# Patient Record
Sex: FEMALE | Race: WHITE | HISPANIC OR LATINO
[De-identification: names, ages, dates, MRNs, and addresses within clinical notes are randomized per-mention and may not be internally consistent; named-entity substitution may affect disease eponyms.]

---

## 2017-11-11 ENCOUNTER — APPOINTMENT (OUTPATIENT)
Dept: OTOLARYNGOLOGY | Facility: CLINIC | Age: 32
End: 2017-11-11
Payer: COMMERCIAL

## 2017-11-11 VITALS
HEART RATE: 77 BPM | WEIGHT: 135 LBS | DIASTOLIC BLOOD PRESSURE: 69 MMHG | BODY MASS INDEX: 23.92 KG/M2 | SYSTOLIC BLOOD PRESSURE: 100 MMHG | HEIGHT: 63 IN

## 2017-11-11 DIAGNOSIS — Z86.39 PERSONAL HISTORY OF OTHER ENDOCRINE, NUTRITIONAL AND METABOLIC DISEASE: ICD-10-CM

## 2017-11-11 PROCEDURE — 99204 OFFICE O/P NEW MOD 45 MIN: CPT | Mod: 25

## 2017-11-11 PROCEDURE — 92567 TYMPANOMETRY: CPT

## 2017-11-11 PROCEDURE — 92557 COMPREHENSIVE HEARING TEST: CPT

## 2017-11-14 PROBLEM — Z86.39 HISTORY OF HYPOTHYROIDISM: Status: RESOLVED | Noted: 2017-11-11 | Resolved: 2017-11-14

## 2017-11-28 ENCOUNTER — APPOINTMENT (OUTPATIENT)
Dept: OTOLARYNGOLOGY | Facility: CLINIC | Age: 32
End: 2017-11-28

## 2017-12-13 ENCOUNTER — APPOINTMENT (OUTPATIENT)
Dept: OTOLARYNGOLOGY | Facility: CLINIC | Age: 32
End: 2017-12-13

## 2018-02-10 ENCOUNTER — APPOINTMENT (OUTPATIENT)
Dept: OTOLARYNGOLOGY | Facility: CLINIC | Age: 33
End: 2018-02-10
Payer: COMMERCIAL

## 2018-02-10 VITALS
HEART RATE: 76 BPM | WEIGHT: 135 LBS | HEIGHT: 63 IN | SYSTOLIC BLOOD PRESSURE: 111 MMHG | BODY MASS INDEX: 23.92 KG/M2 | DIASTOLIC BLOOD PRESSURE: 71 MMHG

## 2018-02-10 PROCEDURE — 99213 OFFICE O/P EST LOW 20 MIN: CPT

## 2018-02-10 RX ORDER — LEVOTHYROXINE SODIUM 0.05 MG/1
50 TABLET ORAL
Qty: 30 | Refills: 0 | Status: ACTIVE | COMMUNITY
Start: 2017-08-02

## 2018-02-10 RX ORDER — NORETHINDRONE 0.35 MG/1
0.35 TABLET ORAL
Qty: 28 | Refills: 0 | Status: DISCONTINUED | COMMUNITY
Start: 2017-08-02

## 2018-02-19 ENCOUNTER — FORM ENCOUNTER (OUTPATIENT)
Age: 33
End: 2018-02-19

## 2018-02-20 ENCOUNTER — OUTPATIENT (OUTPATIENT)
Dept: OUTPATIENT SERVICES | Facility: HOSPITAL | Age: 33
LOS: 1 days | End: 2018-02-20
Payer: COMMERCIAL

## 2018-02-20 ENCOUNTER — APPOINTMENT (OUTPATIENT)
Dept: OBGYN | Facility: CLINIC | Age: 33
End: 2018-02-20
Payer: COMMERCIAL

## 2018-02-20 ENCOUNTER — APPOINTMENT (OUTPATIENT)
Dept: MRI IMAGING | Facility: IMAGING CENTER | Age: 33
End: 2018-02-20
Payer: COMMERCIAL

## 2018-02-20 DIAGNOSIS — H81.02 MENIERE'S DISEASE, LEFT EAR: ICD-10-CM

## 2018-02-20 PROCEDURE — 99395 PREV VISIT EST AGE 18-39: CPT

## 2018-02-20 PROCEDURE — 70553 MRI BRAIN STEM W/O & W/DYE: CPT | Mod: 26

## 2018-02-20 PROCEDURE — A9585: CPT

## 2018-02-20 PROCEDURE — 70553 MRI BRAIN STEM W/O & W/DYE: CPT

## 2018-04-05 ENCOUNTER — APPOINTMENT (OUTPATIENT)
Dept: OTOLARYNGOLOGY | Facility: CLINIC | Age: 33
End: 2018-04-05

## 2018-04-05 ENCOUNTER — RX RENEWAL (OUTPATIENT)
Age: 33
End: 2018-04-05

## 2018-04-06 ENCOUNTER — APPOINTMENT (OUTPATIENT)
Dept: OBGYN | Facility: CLINIC | Age: 33
End: 2018-04-06
Payer: COMMERCIAL

## 2018-04-06 ENCOUNTER — APPOINTMENT (OUTPATIENT)
Dept: OTOLARYNGOLOGY | Facility: CLINIC | Age: 33
End: 2018-04-06
Payer: COMMERCIAL

## 2018-04-06 PROCEDURE — 92537 CALORIC VSTBLR TEST W/REC: CPT

## 2018-04-06 PROCEDURE — 92540 BASIC VESTIBULAR EVALUATION: CPT

## 2018-04-06 PROCEDURE — 81025 URINE PREGNANCY TEST: CPT

## 2018-04-06 PROCEDURE — 92547 SUPPLEMENTAL ELECTRICAL TEST: CPT

## 2018-04-06 PROCEDURE — 92567 TYMPANOMETRY: CPT

## 2018-04-06 PROCEDURE — 92700A: CUSTOM

## 2018-04-06 PROCEDURE — 76998 US GUIDE INTRAOP: CPT

## 2018-04-06 PROCEDURE — 58300 INSERT INTRAUTERINE DEVICE: CPT

## 2018-04-07 ENCOUNTER — APPOINTMENT (OUTPATIENT)
Dept: OTOLARYNGOLOGY | Facility: CLINIC | Age: 33
End: 2018-04-07
Payer: COMMERCIAL

## 2018-04-07 PROCEDURE — 99213 OFFICE O/P EST LOW 20 MIN: CPT

## 2018-04-20 ENCOUNTER — RX RENEWAL (OUTPATIENT)
Age: 33
End: 2018-04-20

## 2018-05-10 ENCOUNTER — APPOINTMENT (OUTPATIENT)
Dept: OBGYN | Facility: CLINIC | Age: 33
End: 2018-05-10

## 2018-05-14 ENCOUNTER — APPOINTMENT (OUTPATIENT)
Dept: OBGYN | Facility: CLINIC | Age: 33
End: 2018-05-14
Payer: COMMERCIAL

## 2018-05-14 PROCEDURE — 99213 OFFICE O/P EST LOW 20 MIN: CPT

## 2019-07-01 ENCOUNTER — APPOINTMENT (OUTPATIENT)
Dept: OBGYN | Facility: CLINIC | Age: 34
End: 2019-07-01

## 2019-07-09 ENCOUNTER — RESULT REVIEW (OUTPATIENT)
Age: 34
End: 2019-07-09

## 2019-07-10 ENCOUNTER — APPOINTMENT (OUTPATIENT)
Dept: OBGYN | Facility: CLINIC | Age: 34
End: 2019-07-10
Payer: COMMERCIAL

## 2019-07-10 PROCEDURE — 99395 PREV VISIT EST AGE 18-39: CPT

## 2019-08-20 ENCOUNTER — APPOINTMENT (OUTPATIENT)
Dept: OTOLARYNGOLOGY | Facility: CLINIC | Age: 34
End: 2019-08-20
Payer: COMMERCIAL

## 2019-08-20 VITALS
HEART RATE: 80 BPM | DIASTOLIC BLOOD PRESSURE: 75 MMHG | BODY MASS INDEX: 24.98 KG/M2 | WEIGHT: 141 LBS | SYSTOLIC BLOOD PRESSURE: 104 MMHG | HEIGHT: 63 IN

## 2019-08-20 PROCEDURE — 99213 OFFICE O/P EST LOW 20 MIN: CPT | Mod: 25

## 2019-08-20 PROCEDURE — 92557 COMPREHENSIVE HEARING TEST: CPT

## 2019-08-20 PROCEDURE — 92567 TYMPANOMETRY: CPT

## 2019-08-20 NOTE — REASON FOR VISIT
[Subsequent Evaluation] : a subsequent evaluation for [Other: _____] : [unfilled] [FreeTextEntry2] : follow up for annual check up, s/p VEMP, history of MD and MAD, salt diet with diuretic prescribed with Meclizine as needed.

## 2019-08-20 NOTE — HISTORY OF PRESENT ILLNESS
[de-identified] : 34 year old female follow up for annual check up, s/p VEMP, history of MD and MAD, salt diet with diuretic prescribed with Meclizine as needed.  Patient states doing well overall, reports no issues.  States Meclizine and Ativan taken as prescribed with good effect.  Denies "head spinning" episodes, states "feeling fine."  Reports mild/intermittent Left ear fullness, denies bilateral otalgia.  Reports constant Left tinnitus.  Denies otorrhea, hearing loss, dizziness, vertigo and ear infections in the past year.

## 2019-12-22 ENCOUNTER — FORM ENCOUNTER (OUTPATIENT)
Age: 34
End: 2019-12-22

## 2020-08-04 ENCOUNTER — APPOINTMENT (OUTPATIENT)
Dept: OTOLARYNGOLOGY | Facility: CLINIC | Age: 35
End: 2020-08-04
Payer: COMMERCIAL

## 2020-08-04 VITALS
TEMPERATURE: 98.5 F | DIASTOLIC BLOOD PRESSURE: 69 MMHG | HEART RATE: 83 BPM | SYSTOLIC BLOOD PRESSURE: 105 MMHG | WEIGHT: 145 LBS | HEIGHT: 63 IN | BODY MASS INDEX: 25.69 KG/M2

## 2020-08-04 PROCEDURE — 99214 OFFICE O/P EST MOD 30 MIN: CPT | Mod: 25

## 2020-08-04 PROCEDURE — 92567 TYMPANOMETRY: CPT

## 2020-08-04 PROCEDURE — 92557 COMPREHENSIVE HEARING TEST: CPT

## 2020-08-05 NOTE — PHYSICAL EXAM
[Rinne Test Air Conduction Persists > Bone Conduction Right] : air conduction greater than bone conduction on the right [Rinne Test Air Conduction Persists > Bone Conduction Left] : air conduction greater than bone conduction on the left [Kirby Test Lateralizes To Right] : tone lateralization to the right [Normal] : orientation to person, place, and time: normal [Hearing Loss Right Only] : normal

## 2020-08-05 NOTE — HISTORY OF PRESENT ILLNESS
[de-identified] : 36 y/o woman with left MD presents today for her annual exam.  Pt. states her symptoms are stable.  Using lorazepam and meclizine prn and diuretic daily.

## 2020-09-09 ENCOUNTER — FORM ENCOUNTER (OUTPATIENT)
Age: 35
End: 2020-09-09

## 2020-09-09 ENCOUNTER — RESULT REVIEW (OUTPATIENT)
Age: 35
End: 2020-09-09

## 2020-09-10 ENCOUNTER — APPOINTMENT (OUTPATIENT)
Dept: OBGYN | Facility: CLINIC | Age: 35
End: 2020-09-10
Payer: COMMERCIAL

## 2020-09-10 ENCOUNTER — FORM ENCOUNTER (OUTPATIENT)
Age: 35
End: 2020-09-10

## 2020-09-10 PROCEDURE — 99395 PREV VISIT EST AGE 18-39: CPT

## 2020-09-15 ENCOUNTER — FORM ENCOUNTER (OUTPATIENT)
Age: 35
End: 2020-09-15

## 2021-07-13 ENCOUNTER — APPOINTMENT (OUTPATIENT)
Dept: OTOLARYNGOLOGY | Facility: CLINIC | Age: 36
End: 2021-07-13
Payer: COMMERCIAL

## 2021-07-13 DIAGNOSIS — H93.8X2 OTHER SPECIFIED DISORDERS OF LEFT EAR: ICD-10-CM

## 2021-07-13 PROCEDURE — 92557 COMPREHENSIVE HEARING TEST: CPT

## 2021-07-13 PROCEDURE — 99213 OFFICE O/P EST LOW 20 MIN: CPT | Mod: 25

## 2021-07-13 PROCEDURE — 92567 TYMPANOMETRY: CPT

## 2021-07-13 NOTE — HISTORY OF PRESENT ILLNESS
[de-identified] : 37 y/o woman with Left MD presents for annual check\par reports stable symptoms\par watching diet and taking diuretic\par BZD's prn\par would like meds renewed

## 2021-07-13 NOTE — DATA REVIEWED
[de-identified] : A pure tone audiogram with speech discrimination and tympanometry was ordered and performed due to patient's chronic and fluctuating hearing loss and no recent audiogram performed to measure these changes or establish a cause at our facility or elsewhere.\par I have independently reviewed the patient's audiogram from today and my findings include\par \par Mild SNHL at 250 Hz rising to WNL thereafter thru 4kHz, mild HL at 6-8kHz AD. Hearing is WNL thru 2kHz, slight to severe SNHL thereafter AS. Type A (normal) tympanograms AU.

## 2021-08-18 ENCOUNTER — APPOINTMENT (OUTPATIENT)
Dept: OTOLARYNGOLOGY | Facility: CLINIC | Age: 36
End: 2021-08-18

## 2021-11-02 ENCOUNTER — APPOINTMENT (OUTPATIENT)
Dept: WOUND CARE | Facility: CLINIC | Age: 36
End: 2021-11-02
Payer: COMMERCIAL

## 2021-11-02 VITALS — RESPIRATION RATE: 14 BRPM | SYSTOLIC BLOOD PRESSURE: 96 MMHG | DIASTOLIC BLOOD PRESSURE: 64 MMHG | TEMPERATURE: 96.3 F

## 2021-11-02 DIAGNOSIS — Z87.828 PERSONAL HISTORY OF OTHER (HEALED) PHYSICAL INJURY AND TRAUMA: ICD-10-CM

## 2021-11-02 DIAGNOSIS — T22.011A BURN OF UNSPECIFIED DEGREE OF RIGHT FOREARM, INITIAL ENCOUNTER: ICD-10-CM

## 2021-11-02 PROCEDURE — 99204 OFFICE O/P NEW MOD 45 MIN: CPT

## 2021-11-02 RX ORDER — SILVER SULFADIAZINE 10 MG/G
1 CREAM TOPICAL TWICE DAILY
Qty: 1 | Refills: 0 | Status: ACTIVE | COMMUNITY
Start: 2021-11-02
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
## 2021-11-02 NOTE — PHYSICAL EXAM
[Alert] : alert [Oriented to Person] : oriented to person [Oriented to Place] : oriented to place [Oriented to Time] : oriented to time [Calm] : calm [Please See PDF for [employer]] : Please See PDF for [employer]. [de-identified] : NAD [de-identified] : equal/strong [de-identified] : healed right forearm wound
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
## 2022-08-23 ENCOUNTER — APPOINTMENT (OUTPATIENT)
Dept: OTOLARYNGOLOGY | Facility: CLINIC | Age: 37
End: 2022-08-23

## 2022-08-23 VITALS
WEIGHT: 145 LBS | SYSTOLIC BLOOD PRESSURE: 132 MMHG | BODY MASS INDEX: 25.69 KG/M2 | HEIGHT: 63 IN | DIASTOLIC BLOOD PRESSURE: 90 MMHG

## 2022-08-23 PROCEDURE — 92504 EAR MICROSCOPY EXAMINATION: CPT

## 2022-08-23 PROCEDURE — 99213 OFFICE O/P EST LOW 20 MIN: CPT

## 2022-08-23 NOTE — HISTORY OF PRESENT ILLNESS
[de-identified] : 37 y/o woman with Left MD presents for annual check\par Continues to use Dyazide and diet modifications. \par Reports intermittent episodes of dizziness with nausea and left sided headaches -lasting 1-2 hours-resolves on its own\par Most recent episode yesterday-lasted about 1 hour. \par Reports constant tinnitus in left ear-able to ignore generally \par Would like medications renewed. \par Reports hearing is stable. \par Denies otalgia, otorrhea,

## 2022-08-23 NOTE — DATA REVIEWED
[de-identified] : An audiogram was ordered and performed including tympanometry, pure tones and speech, for patient's complaint of meniere disease, hearing loss\par I have independently reviewed the patient's audiogram from today and my findings include L HF SNHL

## 2022-08-23 NOTE — PROCEDURE
[NHL] : [facility] [Same] : same as the Pre Op Dx. [] : Binocular Microscopy [FreeTextEntry1] : hearing loss [FreeTextEntry4] : none [FreeTextEntry6] : Operative microscope was used to examine the ear canal, ear drum and visible middle ear landmarks. Adequate exam would not have been possible without the use of a microscope. Findings are described.\par \par

## 2022-08-24 ENCOUNTER — APPOINTMENT (OUTPATIENT)
Dept: OBGYN | Facility: CLINIC | Age: 37
End: 2022-08-24

## 2022-08-24 VITALS
HEART RATE: 85 BPM | HEIGHT: 63 IN | DIASTOLIC BLOOD PRESSURE: 71 MMHG | BODY MASS INDEX: 27.11 KG/M2 | WEIGHT: 153 LBS | SYSTOLIC BLOOD PRESSURE: 106 MMHG

## 2022-08-24 DIAGNOSIS — I82.491 ACUTE EMBOLISM AND THROMBOSIS OF OTHER SPECIFIED DEEP VEIN OF RIGHT LOWER EXTREMITY: ICD-10-CM

## 2022-08-24 DIAGNOSIS — Z97.5 PRESENCE OF (INTRAUTERINE) CONTRACEPTIVE DEVICE: ICD-10-CM

## 2022-08-24 DIAGNOSIS — Z11.51 ENCOUNTER FOR SCREENING FOR HUMAN PAPILLOMAVIRUS (HPV): ICD-10-CM

## 2022-08-24 PROCEDURE — 99395 PREV VISIT EST AGE 18-39: CPT
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
## 2022-08-24 NOTE — PLAN
[FreeTextEntry1] : - Routine breast and pelvic exam done, wnl \par - Pap smear done today w HPV \par - Pt will be freezing her eggs; advised to call office if pt needs to remove IUD \par - RTO in 1 year for annual, or sooner as needed

## 2022-08-24 NOTE — COUNSELING
[Nutrition/ Exercise/ Weight Management] : nutrition, exercise, weight management [Vitamins/Supplements] : vitamins/supplements [Breast Self Exam] : breast self exam [Contraception/ Emergency Contraception/ Safe Sexual Practices] : contraception, emergency contraception, safe sexual practices [Pregnancy Options] : pregnancy options [STD (testing, results, tx)] : STD (testing, results, tx)

## 2022-08-26 ENCOUNTER — TRANSCRIPTION ENCOUNTER (OUTPATIENT)
Age: 37
End: 2022-08-26

## 2022-08-27 LAB — HPV HIGH+LOW RISK DNA PNL CVX: NOT DETECTED

## 2022-09-01 ENCOUNTER — TRANSCRIPTION ENCOUNTER (OUTPATIENT)
Age: 37
End: 2022-09-01

## 2022-09-01 LAB — CYTOLOGY CVX/VAG DOC THIN PREP: NORMAL

## 2023-02-03 ENCOUNTER — NON-APPOINTMENT (OUTPATIENT)
Age: 38
End: 2023-02-03

## 2023-02-07 ENCOUNTER — NON-APPOINTMENT (OUTPATIENT)
Age: 38
End: 2023-02-07

## 2023-02-07 ENCOUNTER — APPOINTMENT (OUTPATIENT)
Dept: ORTHOPEDIC SURGERY | Facility: CLINIC | Age: 38
End: 2023-02-07
Payer: COMMERCIAL

## 2023-02-07 VITALS
HEIGHT: 63 IN | SYSTOLIC BLOOD PRESSURE: 106 MMHG | BODY MASS INDEX: 27.11 KG/M2 | DIASTOLIC BLOOD PRESSURE: 77 MMHG | WEIGHT: 153 LBS

## 2023-02-07 DIAGNOSIS — S76.309A UNSPECIFIED INJURY OF MUSCLE, FASCIA AND TENDON OF THE POSTERIOR MUSCLE GROUP AT THIGH LEVEL, UNSPECIFIED THIGH, INITIAL ENCOUNTER: ICD-10-CM

## 2023-02-07 PROCEDURE — 99204 OFFICE O/P NEW MOD 45 MIN: CPT

## 2023-02-07 RX ORDER — CYCLOBENZAPRINE HYDROCHLORIDE 5 MG/1
5 TABLET, FILM COATED ORAL
Qty: 28 | Refills: 0 | Status: ACTIVE | COMMUNITY
Start: 2023-02-07 | End: 1900-01-01

## 2023-02-07 RX ORDER — DICLOFENAC SODIUM 50 MG/1
50 TABLET, DELAYED RELEASE ORAL
Qty: 28 | Refills: 0 | Status: ACTIVE | COMMUNITY
Start: 2023-02-07 | End: 1900-01-01

## 2023-02-08 NOTE — ADDENDUM
[FreeTextEntry1] : I Jennifer Cruz, DENI, assisted in the history and documentation of the patient with Dr Ne Fletcher on 2/7/23

## 2023-02-08 NOTE — HISTORY OF PRESENT ILLNESS
[de-identified] : KACIE is a 38 year old F who presents with right hamstring pain.  Pain is primarily located at the proximal posterior thigh. \par States that she began training for the [place] marathon in December 2022. She reports that while jogging she felt a sharp pull  of the hamstring on the right which caused her to stop jogging at that time. She stated that the following day the pain worsened. Reports a sharp pain with extension of the right leg.\par States that she rested for at least 1 month but the symptoms have remained the same\par + tylenol and nsaids with no relief\par + ice\par Home exercises and stretches with no relief after 1 month\par no Bruising/swelling\par No imaging\par Denies Prior injury\par Denies bowel/bladder changes, fevers, chills, saddle anesthesia.  Denies numbness, tingling, weakness of the lower extremities.\par

## 2023-02-08 NOTE — PHYSICAL EXAM
[de-identified] : Knee exam\par Constitutional: Well-nourished, well-developed, No acute distress\par Respiratory:  Good respiratory effort, no SOB\par Lymphatic: No regional lymphadenopathy, no lymphedema\par Psychiatric: Pleasant and normal affect, alert and oriented x3\par Skin: Clean dry and intact B/L UE/LE\par Musculoskeletal: normal except where as noted in regional exam\par \par Right Hamstring: non-ttp, full range motion, proximal medial hamstring pain with resisted knee flexion and resisted abd, no marked deformities, no swelling\par \par Right Knee:\par APPEARANCE: no marked deformities, no swelling or malalignment\par POSITIVE TENDERNESS:  none\par NONTENDER: jt lines b/l & retinacula b/l, patellar & quadriceps tendons, MCL/LCL, ITB at the lateral femoral condyle & Gerdy's tubercle, pes bursa. \par ROM: full & painless. \par RESISTIVE TESTING: painless resisted knee flex/ext. \par SPECIAL TESTS: stable v/v stress. painless grind. neg Lachman's. neg ant/post drawer. neg Judi's. neg Thessaly test. neg Elliott's & Malacrae's\par NEURO: Normal sensation of LE, DTRs 2+/4 patella and achilles\par PULSES: 2+ DP/PT pulses\par B/L Hips: No asymmetry, malalignment, or swelling, Full ROM, 5/5 strength in flexion/ext, IR/ER, Abd/Add, Joints stable\par B/L Ankles: No asymmetry, malalignment, or swelling, Full ROM, 5/5 strength in DF/PF/Inv/Ev, Joints stable\par BIOMECHANICAL EXAM: no marked leg length discrepancy, [default value]hip abductor weakness b/l, no marked foot pronation, tight hams and ITB b/l.  Normal gait and station\par \par \par \par \par

## 2023-02-08 NOTE — DISCUSSION/SUMMARY
[de-identified] : Discussed findings of today's exam and possible causes of patient's pain.  Educated patient on their most probable diagnosis of  hamstring strain. Reviewed possible courses of treatment, and we collaboratively decided best course of treatment at this time will include\par - referral to PT\par - rx for diclofenac, cyclobenzaprine\par \par follow up in 6 weeks\par \par Ne Fletcher MD, EdM\par Sports Medicine [facility]\par Department of Orthopedics

## 2023-02-14 ENCOUNTER — APPOINTMENT (OUTPATIENT)
Dept: ORTHOPEDIC SURGERY | Facility: CLINIC | Age: 38
End: 2023-02-14

## 2023-06-23 ENCOUNTER — NON-APPOINTMENT (OUTPATIENT)
Age: 38
End: 2023-06-23

## 2023-07-28 ENCOUNTER — APPOINTMENT (OUTPATIENT)
Dept: OBGYN | Facility: CLINIC | Age: 38
End: 2023-07-28
Payer: COMMERCIAL

## 2023-07-28 VITALS
HEIGHT: 63 IN | HEART RATE: 106 BPM | WEIGHT: 124 LBS | DIASTOLIC BLOOD PRESSURE: 73 MMHG | SYSTOLIC BLOOD PRESSURE: 109 MMHG | BODY MASS INDEX: 21.97 KG/M2

## 2023-07-28 DIAGNOSIS — Z01.419 ENCOUNTER FOR GYNECOLOGICAL EXAMINATION (GENERAL) (ROUTINE) W/OUT ABNORMAL FINDINGS: ICD-10-CM

## 2023-07-28 PROCEDURE — 99395 PREV VISIT EST AGE 18-39: CPT

## 2023-07-28 NOTE — PLAN
[FreeTextEntry1] : 38 year old female pt presents for annual gyn exam\par \par Health Maintenance: \par BSE taught\par Reviewed diet and exercise\par Breast and pelvic exam performed\par Pap/HPV utd\par Advised pt to see PCP annually\par Continue Mirena IUD \par \par RTO in 1 year or PRN\par

## 2023-07-28 NOTE — HISTORY OF PRESENT ILLNESS
[FreeTextEntry1] : 07/28/2023 .KACIE HAGAN   38 year female  G0 , presents for annual gyn exam and offers no complaints last seen 08/22 PAP/HPV - wnl at prior visit.\par \par She reports that she did not follow through with the egg freezing cycle due to cost \par \par She is amenorrheic with Mirena IUD  . She denies VB, abdominal and pelvic pain. No vaginal discharge or vaginitis symptoms. No urinary complaints. BM is normal per patient.\par \par She denies sexual activity.  Her fiance passed away in a car accident last year.\par \par PMH:Hypothyroidism,  right DVT hx in 2016 \par GynHx: PCOS Mirena IUD place April 2018 \par ALL: NKDA\par Soc : Pt is going to [place] with her dad in the summer ,  in [place] [id number]

## 2023-08-29 ENCOUNTER — RX RENEWAL (OUTPATIENT)
Age: 38
End: 2023-08-29

## 2023-09-19 ENCOUNTER — APPOINTMENT (OUTPATIENT)
Dept: OTOLARYNGOLOGY | Facility: CLINIC | Age: 38
End: 2023-09-19
Payer: COMMERCIAL

## 2023-09-19 VITALS
DIASTOLIC BLOOD PRESSURE: 68 MMHG | SYSTOLIC BLOOD PRESSURE: 100 MMHG | BODY MASS INDEX: 25.69 KG/M2 | WEIGHT: 145 LBS | HEIGHT: 63 IN | HEART RATE: 77 BPM

## 2023-09-19 DIAGNOSIS — H81.02 MENIERE'S DISEASE, LEFT EAR: ICD-10-CM

## 2023-09-19 DIAGNOSIS — H90.3 SENSORINEURAL HEARING LOSS, BILATERAL: ICD-10-CM

## 2023-09-19 DIAGNOSIS — G43.809 OTHER MIGRAINE, NOT INTRACTABLE, W/OUT STATUS MIGRAINOSUS: ICD-10-CM

## 2023-09-19 PROCEDURE — 92567 TYMPANOMETRY: CPT

## 2023-09-19 PROCEDURE — 92504 EAR MICROSCOPY EXAMINATION: CPT

## 2023-09-19 PROCEDURE — 92557 COMPREHENSIVE HEARING TEST: CPT

## 2023-09-19 PROCEDURE — 99213 OFFICE O/P EST LOW 20 MIN: CPT

## 2023-09-19 RX ORDER — MECLIZINE HYDROCHLORIDE 12.5 MG/1
12.5 TABLET ORAL
Qty: 270 | Refills: 3 | Status: ACTIVE | COMMUNITY
Start: 2017-11-11 | End: 1900-01-01

## 2023-09-19 RX ORDER — TRIAMTERENE AND HYDROCHLOROTHIAZIDE 25; 37.5 MG/1; MG/1
37.5-25 TABLET ORAL
Qty: 90 | Refills: 3 | Status: ACTIVE | COMMUNITY
Start: 2017-11-11 | End: 1900-01-01

## 2023-09-19 RX ORDER — LORAZEPAM 1 MG/1
1 TABLET ORAL
Qty: 30 | Refills: 0 | Status: ACTIVE | COMMUNITY
Start: 2017-11-11 | End: 1900-01-01

## 2023-09-20 PROBLEM — H90.3 ASYMMETRIC SNHL (SENSORINEURAL HEARING LOSS): Status: ACTIVE | Noted: 2021-07-13

## 2023-09-20 PROBLEM — H81.02 MENIERE DISEASE, LEFT: Status: ACTIVE | Noted: 2017-11-11

## 2023-09-20 PROBLEM — G43.809 VESTIBULAR MIGRAINE: Status: ACTIVE | Noted: 2017-11-14

## 2024-07-16 NOTE — REASON FOR VISIT
Care Due:                  Date            Visit Type   Department     Provider  --------------------------------------------------------------------------------                                MYCHART                              ANNUAL                              CHECKUP/PHY  [facility]  Last Visit: 10-      [facility]       Nadine Randle  Next Visit: None Scheduled  None         None Found                                                            Last  Test          Frequency    Reason                     Performed    Due Date  --------------------------------------------------------------------------------    Office Visit  12 months..  ergocalciferol...........  10-   09-    CMP.........  12 months..  ergocalciferol,            10-   09-                             hydroCHLOROthiazide......    Vitamin D...  12 months..  ergocalciferol...........  10-   09-    [employer] Embedded Care Due Messages. Reference number: [id number].   7/16/2024 6:41:12 AM CDT   [Subsequent Evaluation] : a subsequent evaluation for [FreeTextEntry2] : f/u Left MD

## 2024-07-30 ENCOUNTER — APPOINTMENT (OUTPATIENT)
Dept: OTOLARYNGOLOGY | Facility: CLINIC | Age: 39
End: 2024-07-30
Payer: COMMERCIAL

## 2024-07-30 VITALS — HEART RATE: 80 BPM | SYSTOLIC BLOOD PRESSURE: 120 MMHG | DIASTOLIC BLOOD PRESSURE: 73 MMHG

## 2024-07-30 VITALS — BODY MASS INDEX: 26.58 KG/M2 | HEIGHT: 63 IN | WEIGHT: 150 LBS

## 2024-07-30 DIAGNOSIS — H93.8X2 OTHER SPECIFIED DISORDERS OF LEFT EAR: ICD-10-CM

## 2024-07-30 DIAGNOSIS — H81.02 MENIERE'S DISEASE, LEFT EAR: ICD-10-CM

## 2024-07-30 DIAGNOSIS — H90.3 SENSORINEURAL HEARING LOSS, BILATERAL: ICD-10-CM

## 2024-07-30 DIAGNOSIS — G43.809 OTHER MIGRAINE, NOT INTRACTABLE, W/OUT STATUS MIGRAINOSUS: ICD-10-CM

## 2024-07-30 PROCEDURE — 99213 OFFICE O/P EST LOW 20 MIN: CPT

## 2024-07-30 PROCEDURE — 92557 COMPREHENSIVE HEARING TEST: CPT

## 2024-07-30 PROCEDURE — 92567 TYMPANOMETRY: CPT

## 2024-07-30 PROCEDURE — 92504 EAR MICROSCOPY EXAMINATION: CPT

## 2024-07-30 NOTE — REASON FOR VISIT
[Subsequent Evaluation] : a subsequent evaluation for [FreeTextEntry2] : Left MD, as per patient no changes

## 2024-07-30 NOTE — DATA REVIEWED
[de-identified] : An audiogram was ordered and performed including tympanometry, pure tones and speech, for patient's complaint of meniere disease, hearing loss I have independently reviewed the patient's audiogram from today and my findings include L HF SNHL

## 2024-07-30 NOTE — HISTORY OF PRESENT ILLNESS
[de-identified] : 40 y/o woman with Left MD presents for annual check Continues to use Dyazide and diet modifications.  Reports intermittent episodes of dizziness with nausea and left sided headaches -lasting 1-2 hours-resolves on its own Most recent episode 1 week ago - unsure if it was from heat/sun Reports constant tinnitus in left ear-able to ignore generally  Reports hearing is stable.  Denies otalgia, otorrhea, recent fevers or ear infections, headaches related to hearing.

## 2024-07-30 NOTE — PROCEDURE
[[facility] [Same] : same as the Pre Op Dx. [] : Binocular Microscopy [FreeTextEntry1] : hearing loss [FreeTextEntry4] : none [FreeTextEntry6] : Operative microscope was used to examine the ear canal, ear drum and visible middle ear landmarks. Adequate exam would not have been possible without the use of a microscope. Findings are described.\par  \par

## 2024-07-31 ENCOUNTER — APPOINTMENT (OUTPATIENT)
Dept: OBGYN | Facility: CLINIC | Age: 39
End: 2024-07-31
Payer: COMMERCIAL

## 2024-07-31 VITALS
DIASTOLIC BLOOD PRESSURE: 73 MMHG | SYSTOLIC BLOOD PRESSURE: 114 MMHG | WEIGHT: 150 LBS | BODY MASS INDEX: 26.58 KG/M2 | HEIGHT: 63 IN | HEART RATE: 98 BPM

## 2024-07-31 DIAGNOSIS — Z01.419 ENCOUNTER FOR GYNECOLOGICAL EXAMINATION (GENERAL) (ROUTINE) W/OUT ABNORMAL FINDINGS: ICD-10-CM

## 2024-07-31 PROCEDURE — 99395 PREV VISIT EST AGE 18-39: CPT

## 2024-07-31 NOTE — END OF VISIT
[FreeTextEntry3] :   I, Eileen Tammy, acted as a scribe on behalf of Dr. Maria Luz Lr M.D  on 07/31/2024.   All medical entries made by the scribe were at my, Dr. Maria Luz Lr M.D ,  direction and personally dictated by me on 07/31/2024. I have reviewed the chart and agree that the record accurately reflects my personal performance of the history, physical exam, assessment and plan. I have also personally directed, reviewed, and agreed with the chart.

## 2024-07-31 NOTE — PLAN
[FreeTextEntry1] : 39 year  old female  presents for annual visit.   HCM - May cont Mirena IUD until 2026 - Pap UTD-- will repeat next yr - Mammo baseline age 42   RTO 1yr

## 2024-07-31 NOTE — HISTORY OF PRESENT ILLNESS
[facility]
[FreeTextEntry1] : 39 year  old female year female G0 presents for annual exam. Last seen for annual Jul 2023. Pt had a negative pap/hpv 08/22. Pt has a Mirena IUD placed April 2018. Pt has no current partner. Doing well w/ no complaints.  Pt reports travelling to [place] this summer  Her fiance passed away in a car accident ln 2022  PMH:Hypothyroidism, right DVT hx in 2016 GynHx: PCOS Mirena IUD place April 2018 Soc :  in [address]

## 2025-07-29 ENCOUNTER — RX RENEWAL (OUTPATIENT)
Age: 40
End: 2025-07-29

## 2025-08-05 ENCOUNTER — APPOINTMENT (OUTPATIENT)
Dept: OTOLARYNGOLOGY | Facility: CLINIC | Age: 40
End: 2025-08-05
Payer: COMMERCIAL

## 2025-08-05 VITALS — HEIGHT: 63 IN | WEIGHT: 160 LBS | BODY MASS INDEX: 28.35 KG/M2

## 2025-08-05 DIAGNOSIS — H81.02 MENIERE'S DISEASE, LEFT EAR: ICD-10-CM

## 2025-08-05 DIAGNOSIS — H90.3 SENSORINEURAL HEARING LOSS, BILATERAL: ICD-10-CM

## 2025-08-05 PROCEDURE — 92557 COMPREHENSIVE HEARING TEST: CPT

## 2025-08-05 PROCEDURE — 99213 OFFICE O/P EST LOW 20 MIN: CPT

## 2025-08-05 PROCEDURE — 92567 TYMPANOMETRY: CPT

## 2025-08-05 PROCEDURE — 92504 EAR MICROSCOPY EXAMINATION: CPT

## 2025-08-05 RX ORDER — METHYLPREDNISOLONE 4 MG
125 TABLET, DOSE PACK ORAL
Refills: 0 | Status: ACTIVE | COMMUNITY

## 2025-08-05 RX ORDER — LEVOTHYROXINE SODIUM 0.1 MG/1
100 TABLET ORAL
Refills: 0 | Status: ACTIVE | COMMUNITY

## 2025-08-11 ENCOUNTER — APPOINTMENT (OUTPATIENT)
Dept: OBGYN | Facility: CLINIC | Age: 40
End: 2025-08-11

## 2025-08-12 ENCOUNTER — APPOINTMENT (OUTPATIENT)
Dept: OBGYN | Facility: CLINIC | Age: 40
End: 2025-08-12
Payer: COMMERCIAL

## 2025-08-12 ENCOUNTER — NON-APPOINTMENT (OUTPATIENT)
Age: 40
End: 2025-08-12

## 2025-08-12 VITALS
HEIGHT: 63 IN | DIASTOLIC BLOOD PRESSURE: 76 MMHG | BODY MASS INDEX: 28 KG/M2 | WEIGHT: 158 LBS | SYSTOLIC BLOOD PRESSURE: 112 MMHG

## 2025-08-12 DIAGNOSIS — Z01.419 ENCOUNTER FOR GYNECOLOGICAL EXAMINATION (GENERAL) (ROUTINE) W/OUT ABNORMAL FINDINGS: ICD-10-CM

## 2025-08-12 DIAGNOSIS — Z97.5 PRESENCE OF (INTRAUTERINE) CONTRACEPTIVE DEVICE: ICD-10-CM

## 2025-08-12 PROCEDURE — 99395 PREV VISIT EST AGE 18-39: CPT

## 2025-08-15 LAB — HPV HIGH+LOW RISK DNA PNL CVX: NOT DETECTED

## 2025-08-18 ENCOUNTER — TRANSCRIPTION ENCOUNTER (OUTPATIENT)
Age: 40
End: 2025-08-18

## 2025-08-18 LAB — CYTOLOGY CVX/VAG DOC THIN PREP: NORMAL
